# Patient Record
Sex: FEMALE | URBAN - METROPOLITAN AREA
[De-identification: names, ages, dates, MRNs, and addresses within clinical notes are randomized per-mention and may not be internally consistent; named-entity substitution may affect disease eponyms.]

---

## 2017-01-04 ENCOUNTER — APPOINTMENT (RX ONLY)
Dept: URBAN - METROPOLITAN AREA CLINIC 50 | Facility: CLINIC | Age: 47
Setting detail: DERMATOLOGY
End: 2017-01-04

## 2017-01-04 DIAGNOSIS — Z90.1 ACQUIRED ABSENCE OF BREAST AND NIPPLE: ICD-10-CM

## 2017-01-04 PROBLEM — Z90.13 ACQUIRED ABSENCE OF BILATERAL BREASTS AND NIPPLES: Status: ACTIVE | Noted: 2017-01-04

## 2017-01-04 PROCEDURE — ? TISSUE EXPANDER FILL

## 2017-01-04 PROCEDURE — 99024 POSTOP FOLLOW-UP VISIT: CPT

## 2017-01-04 ASSESSMENT — LOCATION DETAILED DESCRIPTION DERM
LOCATION DETAILED: RIGHT NIPPLE
LOCATION DETAILED: LEFT MEDIAL BREAST 8-9:00 REGION

## 2017-01-04 ASSESSMENT — LOCATION ZONE DERM: LOCATION ZONE: TRUNK

## 2017-01-04 ASSESSMENT — LOCATION SIMPLE DESCRIPTION DERM
LOCATION SIMPLE: LEFT BREAST
LOCATION SIMPLE: RIGHT BREAST

## 2017-01-04 NOTE — HPI: BREAST TISSUE EXPANSION ENCOUNTER (BRIEF)
Which Breast(S) Are We Planning To Expand Today?: both breasts
Is This A New Presentation, Or A Follow-Up?: Breast Tissue Expander Fill
When Was The Expander Placement?: 12/13/16

## 2017-01-04 NOTE — PROCEDURE: TISSUE EXPANDER FILL
Wound care was instituted after removal of dressings. Surgical site inspection was performed, incisions cleaned and dressings were changed.
Postcare: The injection site was hemostatic. A small bandaid was applied to the injection site. There were no apparent complications.
Volume Added (Cc): 106 Jackie Romero
Starting Volume (Cc): 0818 [facility]
Tissue Expander Text: The tissue expander port site was identified. The skin overlying the port site was sterilely cleansed. The needle was introduced into the expander, entry was confirmed by drawback on the plunger.
Detail Level: Detailed
Starting Volume (Cc): 250
Volume Added (Cc): 100

## 2017-01-05 ENCOUNTER — RX ONLY (OUTPATIENT)
Age: 47
Setting detail: RX ONLY
End: 2017-01-05

## 2017-01-05 RX ORDER — CEPHALEXIN 500 MG/1
CAPSULE ORAL TID
Qty: 21 | Refills: 0 | Status: ERX

## 2017-01-09 ENCOUNTER — APPOINTMENT (RX ONLY)
Dept: URBAN - METROPOLITAN AREA CLINIC 50 | Facility: CLINIC | Age: 47
Setting detail: DERMATOLOGY
End: 2017-01-09

## 2017-01-09 ENCOUNTER — RX ONLY (OUTPATIENT)
Age: 47
Setting detail: RX ONLY
End: 2017-01-09

## 2017-01-09 DIAGNOSIS — Z48.89 ENCOUNTER FOR OTHER SPECIFIED SURGICAL AFTERCARE: ICD-10-CM

## 2017-01-09 PROCEDURE — ? POST-OP CHECK

## 2017-01-09 PROCEDURE — 99024 POSTOP FOLLOW-UP VISIT: CPT

## 2017-01-09 RX ORDER — SULFAMETHOXAZOLE AND TRIMETHOPRIM 800; 160 MG/1; MG/1
TABLET ORAL
Qty: 14 | Refills: 1 | Status: ERX | COMMUNITY
Start: 2017-01-09

## 2017-01-09 RX ORDER — CEPHALEXIN 500 MG/1
CAPSULE ORAL
Qty: 21 | Refills: 1 | Status: ERX

## 2017-01-09 ASSESSMENT — LOCATION ZONE DERM: LOCATION ZONE: TRUNK

## 2017-01-09 ASSESSMENT — LOCATION SIMPLE DESCRIPTION DERM: LOCATION SIMPLE: LEFT BREAST

## 2017-01-09 ASSESSMENT — LOCATION DETAILED DESCRIPTION DERM: LOCATION DETAILED: LEFT AREOLA

## 2017-01-09 NOTE — PROCEDURE: POST-OP CHECK
Add Postop Global No-Charge Code (63388)?: yes
Detail Level: Detailed
Wound Evaluated By: Dr. Keaton Boston

## 2017-01-11 ENCOUNTER — APPOINTMENT (RX ONLY)
Dept: URBAN - METROPOLITAN AREA CLINIC 50 | Facility: CLINIC | Age: 47
Setting detail: DERMATOLOGY
End: 2017-01-11

## 2017-01-11 DIAGNOSIS — Z48.89 ENCOUNTER FOR OTHER SPECIFIED SURGICAL AFTERCARE: ICD-10-CM

## 2017-01-11 PROCEDURE — 99024 POSTOP FOLLOW-UP VISIT: CPT

## 2017-01-11 PROCEDURE — ? POST-OP CHECK

## 2017-01-11 ASSESSMENT — LOCATION SIMPLE DESCRIPTION DERM: LOCATION SIMPLE: LEFT BREAST

## 2017-01-11 ASSESSMENT — LOCATION DETAILED DESCRIPTION DERM: LOCATION DETAILED: LEFT MEDIAL BREAST 9-10:00 REGION

## 2017-01-11 ASSESSMENT — LOCATION ZONE DERM: LOCATION ZONE: TRUNK

## 2017-01-11 NOTE — HPI: POST-OP CHECK
History: Patient presents for post-op check status post bilateral immediate breast reconstruction with expander insertion done on 12/13/16.

## 2017-01-11 NOTE — PROCEDURE: POST-OP CHECK
Detail Level: Detailed
Add Postop Global No-Charge Code (00020)?: yes
Body Location Override (Optional): Bilateral chest
Wound Evaluated By: Dr. Keaton Boston

## 2017-01-18 ENCOUNTER — APPOINTMENT (RX ONLY)
Dept: URBAN - METROPOLITAN AREA CLINIC 50 | Facility: CLINIC | Age: 47
Setting detail: DERMATOLOGY
End: 2017-01-18

## 2017-01-18 DIAGNOSIS — Z90.1 ACQUIRED ABSENCE OF BREAST AND NIPPLE: ICD-10-CM

## 2017-01-18 PROBLEM — Z90.13 ACQUIRED ABSENCE OF BILATERAL BREASTS AND NIPPLES: Status: ACTIVE | Noted: 2017-01-18

## 2017-01-18 PROCEDURE — ? TISSUE EXPANDER FILL

## 2017-01-18 PROCEDURE — 99024 POSTOP FOLLOW-UP VISIT: CPT

## 2017-01-18 ASSESSMENT — LOCATION DETAILED DESCRIPTION DERM
LOCATION DETAILED: RIGHT MEDIAL BREAST 2-3:00 REGION
LOCATION DETAILED: RIGHT AREOLA
LOCATION DETAILED: LEFT AREOLA

## 2017-01-18 ASSESSMENT — LOCATION ZONE DERM: LOCATION ZONE: TRUNK

## 2017-01-18 ASSESSMENT — LOCATION SIMPLE DESCRIPTION DERM
LOCATION SIMPLE: LEFT BREAST
LOCATION SIMPLE: RIGHT BREAST

## 2017-01-18 NOTE — PROCEDURE: TISSUE EXPANDER FILL
Tissue Expander Text: The tissue expander port site was identified. The skin overlying the port site was sterilely cleansed. The needle was introduced into the expander, entry was confirmed by drawback on the plunger.
Postcare: The injection site was hemostatic. A small bandaid was applied to the injection site. There were no apparent complications.
Volume Added (Cc): 106 Jackie Romero
Wound care was instituted after removal of dressings. Surgical site inspection was performed, incisions cleaned and dressings were changed.
Volume Added (Cc): 120
Detail Level: Detailed
Volume Added (Cc): 120cc B/L. Final fill volume today: R - 490.    L - 470
Starting Volume (Cc): R - 370.  L - 350

## 2017-01-18 NOTE — HPI: POST-OP CHECK
History: Patient presents today for further tissue expansion bilateral breast status post bilateral mastectomy with immediate reconstruction insertion of tissue expanders.

## 2017-01-23 ENCOUNTER — APPOINTMENT (RX ONLY)
Dept: URBAN - METROPOLITAN AREA CLINIC 50 | Facility: CLINIC | Age: 47
Setting detail: DERMATOLOGY
End: 2017-01-23

## 2017-01-23 DIAGNOSIS — Z90.1 ACQUIRED ABSENCE OF BREAST AND NIPPLE: ICD-10-CM

## 2017-01-23 PROBLEM — Z90.13 ACQUIRED ABSENCE OF BILATERAL BREASTS AND NIPPLES: Status: ACTIVE | Noted: 2017-01-23

## 2017-01-23 PROCEDURE — ? TISSUE EXPANDER FILL

## 2017-01-23 PROCEDURE — 99024 POSTOP FOLLOW-UP VISIT: CPT

## 2017-01-23 ASSESSMENT — LOCATION DETAILED DESCRIPTION DERM
LOCATION DETAILED: LEFT PERIAREOLAR BREAST 6-7:00 REGION
LOCATION DETAILED: RIGHT PERIAREOLAR BREAST 5-6:00 REGION

## 2017-01-23 ASSESSMENT — LOCATION SIMPLE DESCRIPTION DERM
LOCATION SIMPLE: RIGHT BREAST
LOCATION SIMPLE: LEFT BREAST

## 2017-01-23 ASSESSMENT — LOCATION ZONE DERM: LOCATION ZONE: TRUNK

## 2017-01-23 NOTE — PROCEDURE: TISSUE EXPANDER FILL
Wound care was instituted after removal of dressings. Surgical site inspection was performed, incisions cleaned and dressings were changed.
Volume Added (Cc): 100
Detail Level: Detailed
Tissue Expander Text: The tissue expander port site was identified. The skin overlying the port site was sterilely cleansed. The needle was introduced into the expander, entry was confirmed by drawback on the plunger.
Starting Volume (Cc): [address]
Starting Volume (Cc): 409 [facility]
Postcare: The injection site was hemostatic. A small bandaid was applied to the injection site. There were no apparent complications.

## 2017-01-30 ENCOUNTER — APPOINTMENT (RX ONLY)
Dept: URBAN - METROPOLITAN AREA CLINIC 50 | Facility: CLINIC | Age: 47
Setting detail: DERMATOLOGY
End: 2017-01-30

## 2017-01-30 DIAGNOSIS — Z90.1 ACQUIRED ABSENCE OF BREAST AND NIPPLE: ICD-10-CM

## 2017-01-30 PROBLEM — Z90.13 ACQUIRED ABSENCE OF BILATERAL BREASTS AND NIPPLES: Status: ACTIVE | Noted: 2017-01-30

## 2017-01-30 PROCEDURE — ? TISSUE EXPANDER FILL

## 2017-01-30 ASSESSMENT — LOCATION DETAILED DESCRIPTION DERM
LOCATION DETAILED: RIGHT NIPPLE
LOCATION DETAILED: LEFT AREOLA

## 2017-01-30 ASSESSMENT — LOCATION SIMPLE DESCRIPTION DERM
LOCATION SIMPLE: RIGHT BREAST
LOCATION SIMPLE: LEFT BREAST

## 2017-01-30 ASSESSMENT — LOCATION ZONE DERM: LOCATION ZONE: TRUNK

## 2017-01-30 NOTE — HPI: POST-OP CHECK
History: Patient presents today for post-op check and further tissue expansion to the bilateral breasts due bilateral mastectomy with immediate insertion of expanders done on 12/13/16.

## 2017-02-13 ENCOUNTER — APPOINTMENT (RX ONLY)
Dept: URBAN - METROPOLITAN AREA CLINIC 50 | Facility: CLINIC | Age: 47
Setting detail: DERMATOLOGY
End: 2017-02-13

## 2017-02-13 DIAGNOSIS — Z48.89 ENCOUNTER FOR OTHER SPECIFIED SURGICAL AFTERCARE: ICD-10-CM

## 2017-02-13 PROCEDURE — 99024 POSTOP FOLLOW-UP VISIT: CPT

## 2017-02-13 PROCEDURE — ? POST-OP CHECK

## 2017-02-13 ASSESSMENT — LOCATION SIMPLE DESCRIPTION DERM
LOCATION SIMPLE: RIGHT BREAST
LOCATION SIMPLE: LEFT BREAST

## 2017-02-13 ASSESSMENT — LOCATION ZONE DERM: LOCATION ZONE: TRUNK

## 2017-02-13 ASSESSMENT — LOCATION DETAILED DESCRIPTION DERM
LOCATION DETAILED: RIGHT MEDIAL BREAST 5-6:00 REGION
LOCATION DETAILED: LEFT MEDIAL BREAST 8-9:00 REGION

## 2017-02-13 NOTE — PROCEDURE: POST-OP CHECK
Add Postop Global No-Charge Code (26727)?: yes
Detail Level: Detailed
Wound Evaluated By: Dr. Yudith Fernandes
Wound Evaluated By: Dr. Becky Messina

## 2017-03-20 ENCOUNTER — APPOINTMENT (RX ONLY)
Dept: URBAN - METROPOLITAN AREA CLINIC 50 | Facility: CLINIC | Age: 47
Setting detail: DERMATOLOGY
End: 2017-03-20

## 2017-03-20 DIAGNOSIS — Z48.89 ENCOUNTER FOR OTHER SPECIFIED SURGICAL AFTERCARE: ICD-10-CM

## 2017-03-20 PROCEDURE — 99024 POSTOP FOLLOW-UP VISIT: CPT

## 2017-03-20 PROCEDURE — ? POST-OP CHECK

## 2017-03-20 ASSESSMENT — LOCATION DETAILED DESCRIPTION DERM: LOCATION DETAILED: LEFT MEDIAL BREAST 10-11:00 REGION

## 2017-03-20 ASSESSMENT — LOCATION SIMPLE DESCRIPTION DERM: LOCATION SIMPLE: LEFT BREAST

## 2017-03-20 ASSESSMENT — LOCATION ZONE DERM: LOCATION ZONE: TRUNK

## 2017-03-20 NOTE — HPI: POST-OP CHECK
History: Patient presents today for re-check status post bilateral breast reconstruction done on 12/16/16. Patient states she has no concerns today just a general re-check.

## 2017-03-20 NOTE — PROCEDURE: POST-OP CHECK
Wound Evaluated By: Dr. Chris Mar
Add Postop Global No-Charge Code (09738)?: yes
Detail Level: Detailed
Body Location Override (Optional): Bilateral breasts

## 2017-04-17 ENCOUNTER — RX ONLY (OUTPATIENT)
Age: 47
Setting detail: RX ONLY
End: 2017-04-17

## 2017-04-17 ENCOUNTER — APPOINTMENT (RX ONLY)
Dept: URBAN - METROPOLITAN AREA CLINIC 50 | Facility: CLINIC | Age: 47
Setting detail: DERMATOLOGY
End: 2017-04-17

## 2017-04-17 DIAGNOSIS — Z48.89 ENCOUNTER FOR OTHER SPECIFIED SURGICAL AFTERCARE: ICD-10-CM

## 2017-04-17 PROCEDURE — 99024 POSTOP FOLLOW-UP VISIT: CPT

## 2017-04-17 PROCEDURE — ? POST-OP CHECK

## 2017-04-17 RX ORDER — CEPHALEXIN 500 MG/1
CAPSULE ORAL
Qty: 21 | Refills: 0 | Status: ERX

## 2017-04-17 ASSESSMENT — LOCATION ZONE DERM: LOCATION ZONE: TRUNK

## 2017-04-17 ASSESSMENT — LOCATION SIMPLE DESCRIPTION DERM: LOCATION SIMPLE: LEFT BREAST

## 2017-04-17 ASSESSMENT — LOCATION DETAILED DESCRIPTION DERM: LOCATION DETAILED: LEFT MEDIAL BREAST 7-8:00 REGION

## 2017-04-17 NOTE — PROCEDURE: POST-OP CHECK
Detail Level: Detailed
Wound Evaluated By: Dr. David Tamayo
Body Location Override (Optional): Bilateral breast
Add Postop Global No-Charge Code (95689)?: yes

## 2017-05-17 ENCOUNTER — APPOINTMENT (RX ONLY)
Dept: URBAN - METROPOLITAN AREA CLINIC 50 | Facility: CLINIC | Age: 47
Setting detail: DERMATOLOGY
End: 2017-05-17

## 2017-05-17 DIAGNOSIS — Z48.89 ENCOUNTER FOR OTHER SPECIFIED SURGICAL AFTERCARE: ICD-10-CM

## 2017-05-17 PROCEDURE — 99024 POSTOP FOLLOW-UP VISIT: CPT

## 2017-05-17 PROCEDURE — ? POST-OP CHECK

## 2017-05-17 ASSESSMENT — LOCATION ZONE DERM: LOCATION ZONE: TRUNK

## 2017-05-17 ASSESSMENT — LOCATION DETAILED DESCRIPTION DERM: LOCATION DETAILED: RIGHT MEDIAL BREAST 5-6:00 REGION

## 2017-05-17 ASSESSMENT — LOCATION SIMPLE DESCRIPTION DERM: LOCATION SIMPLE: RIGHT BREAST

## 2017-05-17 NOTE — PROCEDURE: POST-OP CHECK
Wound Evaluated By: Dr. Diana Rivas
Detail Level: Detailed
Add Postop Global No-Charge Code (51431)?: yes
Body Location Override (Optional): Bilateral breast

## 2017-05-17 NOTE — HPI: POST-OP CHECK, BREAST RECONSTRUCTION (EXTENDED)
Where Is Your Surgical Site To Be Checked?: both breasts
How Severe Is Your Pain?: moderate
How Is Your Wound Healing?: healing well
Date Of Procedure: 5/16/201

## 2017-05-24 ENCOUNTER — APPOINTMENT (RX ONLY)
Dept: URBAN - METROPOLITAN AREA CLINIC 50 | Facility: CLINIC | Age: 47
Setting detail: DERMATOLOGY
End: 2017-05-24

## 2017-05-24 DIAGNOSIS — Z48.89 ENCOUNTER FOR OTHER SPECIFIED SURGICAL AFTERCARE: ICD-10-CM

## 2017-05-24 PROCEDURE — ? POST-OP CHECK

## 2017-05-24 PROCEDURE — 99024 POSTOP FOLLOW-UP VISIT: CPT

## 2017-05-24 ASSESSMENT — LOCATION DETAILED DESCRIPTION DERM
LOCATION DETAILED: RIGHT MEDIAL BREAST 4-5:00 REGION
LOCATION DETAILED: LEFT MEDIAL BREAST 7-8:00 REGION

## 2017-05-24 ASSESSMENT — LOCATION SIMPLE DESCRIPTION DERM
LOCATION SIMPLE: LEFT BREAST
LOCATION SIMPLE: RIGHT BREAST

## 2017-05-24 ASSESSMENT — LOCATION ZONE DERM: LOCATION ZONE: TRUNK

## 2017-05-24 NOTE — PROCEDURE: POST-OP CHECK
Add Postop Global No-Charge Code (75577)?: yes
Detail Level: Detailed
Wound Evaluated By: Dr. Maren Borrero
Wound Evaluated By: Dr. Marko Strong

## 2017-05-30 ENCOUNTER — APPOINTMENT (RX ONLY)
Dept: URBAN - METROPOLITAN AREA CLINIC 50 | Facility: CLINIC | Age: 47
Setting detail: DERMATOLOGY
End: 2017-05-30

## 2017-05-30 DIAGNOSIS — Z48.89 ENCOUNTER FOR OTHER SPECIFIED SURGICAL AFTERCARE: ICD-10-CM

## 2017-05-30 PROCEDURE — 99024 POSTOP FOLLOW-UP VISIT: CPT

## 2017-05-30 PROCEDURE — ? POST-OP CHECK

## 2017-05-30 ASSESSMENT — LOCATION ZONE DERM: LOCATION ZONE: TRUNK

## 2017-05-30 ASSESSMENT — LOCATION DETAILED DESCRIPTION DERM: LOCATION DETAILED: RIGHT PERIAREOLAR BREAST 4-5:00 REGION

## 2017-05-30 ASSESSMENT — LOCATION SIMPLE DESCRIPTION DERM: LOCATION SIMPLE: RIGHT BREAST

## 2017-05-30 NOTE — PROCEDURE: POST-OP CHECK
Body Location Override (Optional): Bilateral breasts
Wound Evaluated By: Dr. Sarabjit Peralta
Add Postop Global No-Charge Code (81066)?: yes
Detail Level: Detailed

## 2017-06-05 ENCOUNTER — RX ONLY (OUTPATIENT)
Age: 47
Setting detail: RX ONLY
End: 2017-06-05

## 2017-06-05 ENCOUNTER — APPOINTMENT (RX ONLY)
Dept: URBAN - METROPOLITAN AREA CLINIC 50 | Facility: CLINIC | Age: 47
Setting detail: DERMATOLOGY
End: 2017-06-05

## 2017-06-05 DIAGNOSIS — Z48.02 ENCOUNTER FOR REMOVAL OF SUTURES: ICD-10-CM

## 2017-06-05 PROCEDURE — ? SUTURE REMOVAL

## 2017-06-05 PROCEDURE — 99024 POSTOP FOLLOW-UP VISIT: CPT

## 2017-06-05 RX ORDER — CEPHALEXIN 500 MG/1
CAPSULE ORAL
Qty: 21 | Refills: 0 | Status: ERX

## 2017-06-05 ASSESSMENT — LOCATION SIMPLE DESCRIPTION DERM
LOCATION SIMPLE: RIGHT BREAST
LOCATION SIMPLE: LEFT BREAST

## 2017-06-05 ASSESSMENT — LOCATION DETAILED DESCRIPTION DERM
LOCATION DETAILED: LEFT AREOLA
LOCATION DETAILED: RIGHT PERIAREOLAR BREAST 5-6:00 REGION

## 2017-06-05 ASSESSMENT — LOCATION ZONE DERM: LOCATION ZONE: TRUNK

## 2017-06-12 ENCOUNTER — RX ONLY (OUTPATIENT)
Age: 47
Setting detail: RX ONLY
End: 2017-06-12

## 2017-06-12 ENCOUNTER — APPOINTMENT (RX ONLY)
Dept: URBAN - METROPOLITAN AREA CLINIC 50 | Facility: CLINIC | Age: 47
Setting detail: DERMATOLOGY
End: 2017-06-12

## 2017-06-12 DIAGNOSIS — Z48.89 ENCOUNTER FOR OTHER SPECIFIED SURGICAL AFTERCARE: ICD-10-CM

## 2017-06-12 PROCEDURE — 99024 POSTOP FOLLOW-UP VISIT: CPT

## 2017-06-12 PROCEDURE — ? POST-OP CHECK

## 2017-06-12 RX ORDER — DOXYCYCLINE HYCLATE 100 MG/1
CAPSULE, GELATIN COATED ORAL
Qty: 14 | Refills: 1 | Status: ERX | COMMUNITY
Start: 2017-06-12

## 2017-06-12 ASSESSMENT — LOCATION SIMPLE DESCRIPTION DERM: LOCATION SIMPLE: RIGHT BREAST

## 2017-06-12 ASSESSMENT — LOCATION ZONE DERM: LOCATION ZONE: TRUNK

## 2017-06-12 ASSESSMENT — LOCATION DETAILED DESCRIPTION DERM: LOCATION DETAILED: RIGHT MEDIAL BREAST 3-4:00 REGION

## 2017-06-12 NOTE — PROCEDURE: POST-OP CHECK
Detail Level: Detailed
Add Postop Global No-Charge Code ([id number])?: yes
Wound Evaluated By: Dr. Antonia Paulino

## 2017-06-19 ENCOUNTER — APPOINTMENT (RX ONLY)
Dept: URBAN - METROPOLITAN AREA CLINIC 50 | Facility: CLINIC | Age: 47
Setting detail: DERMATOLOGY
End: 2017-06-19

## 2017-06-19 DIAGNOSIS — Z48.89 ENCOUNTER FOR OTHER SPECIFIED SURGICAL AFTERCARE: ICD-10-CM

## 2017-06-19 PROCEDURE — ? POST-OP CHECK

## 2017-06-19 PROCEDURE — 99024 POSTOP FOLLOW-UP VISIT: CPT

## 2017-06-19 ASSESSMENT — LOCATION DETAILED DESCRIPTION DERM: LOCATION DETAILED: RIGHT LATERAL BREAST 6-7:00 REGION

## 2017-06-19 ASSESSMENT — LOCATION ZONE DERM: LOCATION ZONE: TRUNK

## 2017-06-19 ASSESSMENT — LOCATION SIMPLE DESCRIPTION DERM: LOCATION SIMPLE: RIGHT BREAST

## 2017-06-19 NOTE — PROCEDURE: POST-OP CHECK
Wound Evaluated By: Dr. Marko Strong
Detail Level: Detailed
Body Location Override (Optional): bilateral breasts
Add Postop Global No-Charge Code (96962)?: yes

## 2017-06-19 NOTE — HPI: POST-OP CHECK
History: Patient presents today for re-check status post exchange for permanent implant exchange bilateral breasts done on 5/16/17.

## 2017-07-10 ENCOUNTER — APPOINTMENT (RX ONLY)
Dept: URBAN - METROPOLITAN AREA CLINIC 50 | Facility: CLINIC | Age: 47
Setting detail: DERMATOLOGY
End: 2017-07-10

## 2017-07-10 DIAGNOSIS — Z48.89 ENCOUNTER FOR OTHER SPECIFIED SURGICAL AFTERCARE: ICD-10-CM

## 2017-07-10 PROCEDURE — 99024 POSTOP FOLLOW-UP VISIT: CPT

## 2017-07-10 PROCEDURE — ? POST-OP CHECK

## 2017-07-10 ASSESSMENT — LOCATION DETAILED DESCRIPTION DERM: LOCATION DETAILED: RIGHT PERIAREOLAR BREAST 4-5:00 REGION

## 2017-07-10 ASSESSMENT — LOCATION SIMPLE DESCRIPTION DERM: LOCATION SIMPLE: RIGHT BREAST

## 2017-07-10 ASSESSMENT — LOCATION ZONE DERM: LOCATION ZONE: TRUNK

## 2017-07-10 NOTE — HPI: POST-OP CHECK
History: Patient presents for routine follow up status post bilateral breast reconstruction. Patient states seems to be healing as aspected no specific concerns today.

## 2017-07-10 NOTE — PROCEDURE: POST-OP CHECK
Detail Level: Detailed
Wound Evaluated By: Dr. Nick Coughlin
Body Location Override (Optional): bilateral breasts
Add Postop Global No-Charge Code (05634)?: yes

## 2017-07-31 ENCOUNTER — APPOINTMENT (RX ONLY)
Dept: URBAN - METROPOLITAN AREA CLINIC 50 | Facility: CLINIC | Age: 47
Setting detail: DERMATOLOGY
End: 2017-07-31

## 2017-07-31 DIAGNOSIS — L98.8 OTHER SPECIFIED DISORDERS OF THE SKIN AND SUBCUTANEOUS TISSUE: ICD-10-CM

## 2017-07-31 DIAGNOSIS — Z48.89 ENCOUNTER FOR OTHER SPECIFIED SURGICAL AFTERCARE: ICD-10-CM

## 2017-07-31 PROBLEM — L90.8 OTHER ATROPHIC DISORDERS OF SKIN: Status: ACTIVE | Noted: 2017-07-31

## 2017-07-31 PROCEDURE — ? POST-OP CHECK

## 2017-07-31 PROCEDURE — ? BOTOX (U OR CC)

## 2017-07-31 PROCEDURE — 99024 POSTOP FOLLOW-UP VISIT: CPT

## 2017-07-31 ASSESSMENT — LOCATION ZONE DERM: LOCATION ZONE: TRUNK

## 2017-07-31 ASSESSMENT — LOCATION DETAILED DESCRIPTION DERM: LOCATION DETAILED: RIGHT MEDIAL BREAST 2-3:00 REGION

## 2017-07-31 ASSESSMENT — LOCATION SIMPLE DESCRIPTION DERM: LOCATION SIMPLE: RIGHT BREAST

## 2017-07-31 NOTE — PROCEDURE: POST-OP CHECK
Body Location Override (Optional): bilateral breasts
Detail Level: Detailed
Wound Evaluated By: Dr. Elizabeth Horner
Add Postop Global No-Charge Code (93385)?: yes

## 2017-07-31 NOTE — PROCEDURE: BOTOX (U OR CC)
Dilution (U/0.1 Cc): 1.5
Nasal Root Units/Cc: 0
Document As Units Or Cc?: units
Consent: Written consent obtained. Risks include but not limited to lid/brow ptosis, bruising, swelling, diplopia, temporary effect, incomplete chemical denervation.
Detail Level: Detailed
Glabellar Complex Units: 4
Expiration Date (Month Year): 10/17
Periorbital Skin Units/Cc: 6
Lot #:  [id number]
Topical Anesthesia?: 20% benzocaine, 8% lidocaine, 4% tetracaine
Forehead Units/Cc: 10
Post-Care Instructions: Patient instructed to not lie down for 4 hours and limit physical activity for 24 hours. Patient instructed not to travel by airplane for 48 hours.

## 2017-07-31 NOTE — HPI: POST-OP CHECK
History: Patient presents today for re-check status post bilateral breast reconstruction done on 5/16/17.

## 2017-08-28 ENCOUNTER — APPOINTMENT (RX ONLY)
Dept: URBAN - METROPOLITAN AREA CLINIC 50 | Facility: CLINIC | Age: 47
Setting detail: DERMATOLOGY
End: 2017-08-28

## 2017-08-28 DIAGNOSIS — Z48.89 ENCOUNTER FOR OTHER SPECIFIED SURGICAL AFTERCARE: ICD-10-CM

## 2017-08-28 PROCEDURE — 99024 POSTOP FOLLOW-UP VISIT: CPT

## 2017-08-28 PROCEDURE — ? POST-OP CHECK

## 2017-08-28 ASSESSMENT — LOCATION DETAILED DESCRIPTION DERM
LOCATION DETAILED: RIGHT PERIAREOLAR BREAST 3-4:00 REGION
LOCATION DETAILED: LEFT AREOLA

## 2017-08-28 ASSESSMENT — LOCATION SIMPLE DESCRIPTION DERM
LOCATION SIMPLE: LEFT BREAST
LOCATION SIMPLE: RIGHT BREAST

## 2017-08-28 ASSESSMENT — LOCATION ZONE DERM: LOCATION ZONE: TRUNK

## 2017-08-28 NOTE — PROCEDURE: POST-OP CHECK
Wound Evaluated By: Dr. Aida Vidal
Detail Level: Detailed
Wound Evaluated By: Dr. Keaton Boston
Add Postop Global No-Charge Code (91675)?: yes

## 2017-11-13 ENCOUNTER — RX ONLY (OUTPATIENT)
Age: 47
Setting detail: RX ONLY
End: 2017-11-13

## 2017-11-13 ENCOUNTER — APPOINTMENT (RX ONLY)
Dept: URBAN - METROPOLITAN AREA CLINIC 50 | Facility: CLINIC | Age: 47
Setting detail: DERMATOLOGY
End: 2017-11-13

## 2017-11-13 DIAGNOSIS — N65.1 DISPROPORTION OF RECONSTRUCTED BREAST: ICD-10-CM

## 2017-11-13 PROCEDURE — 20926: CPT

## 2017-11-13 PROCEDURE — ? LIPOSUCTION AND FAT GRAFTING

## 2017-11-13 PROCEDURE — ? OTHER

## 2017-11-13 RX ORDER — CEPHALEXIN 500 MG/1
CAPSULE ORAL TID
Qty: 12 | Refills: 0 | Status: ERX

## 2017-11-13 ASSESSMENT — LOCATION SIMPLE DESCRIPTION DERM: LOCATION SIMPLE: LEFT BREAST

## 2017-11-13 ASSESSMENT — LOCATION DETAILED DESCRIPTION DERM: LOCATION DETAILED: LEFT NIPPLE

## 2017-11-13 ASSESSMENT — LOCATION ZONE DERM: LOCATION ZONE: TRUNK

## 2017-11-13 NOTE — PROCEDURE: OTHER
Sticky Other (Free Text): Fat grafting to the left nipple for symmetry taking a total of 20 cc bilateral flanks 10 cc from each side grafting it to the left nipple area today.
Detail Level: Zone

## 2017-11-13 NOTE — PROCEDURE: LIPOSUCTION AND FAT GRAFTING
Total Aspirate Volume In Cc: 1 [facility]
Detail Level: Detailed
Surgeon: Ed Whitehead
Total Supernatant Fat Aspirate Volume In Cc: 24
Estimated Blood Loss (Cc): minimal
Positioning: The patient was placed supine for liposuction.
[facility]
Left Breast: 7cc
Consent: The risks, benefits, expectations and alternatives of liposuction were discussed with the patient preoperatively and include but are not limited to: infection, bruising, lumpiness, pain, anesthesia reaction, dysethesia, scarring in treatment area or puncture point, vasovagal reactions, tachycardia, nausea, necrosis, ulceration, color change and asymmetry.
Total Tumescent Anesthesia Volume In Cc: [address] [place]
Monitoring: Full continuous cardiac monitoring and automated blood-pressure measurements were performed per protocol.
Pre-Tunneling: Pretunneling was performed bluntly prior to infiltration of wetting solution.
Infiltration: Wetting solution was infiltrated into the treatment area using infiltration cannulae, distributing the solution evenly until satisfactory tissue turgor was achieved.

## 2017-11-13 NOTE — HPI: OTHER
Condition:: Asymmetry left nipple status post breast cancer reconstruction surgery. Presents today for fat grafting to the left nipple taking fat from right and left flank areas about 10 cc from each side.
Please Describe Your Condition:: Left nipple asymmetry

## 2017-11-20 ENCOUNTER — APPOINTMENT (RX ONLY)
Dept: URBAN - METROPOLITAN AREA CLINIC 50 | Facility: CLINIC | Age: 47
Setting detail: DERMATOLOGY
End: 2017-11-20

## 2017-11-20 DIAGNOSIS — Z48.89 ENCOUNTER FOR OTHER SPECIFIED SURGICAL AFTERCARE: ICD-10-CM

## 2017-11-20 PROCEDURE — 99024 POSTOP FOLLOW-UP VISIT: CPT

## 2017-11-20 PROCEDURE — ? POST-OP CHECK

## 2017-11-20 ASSESSMENT — LOCATION DETAILED DESCRIPTION DERM: LOCATION DETAILED: LEFT MEDIAL BREAST 9-10:00 REGION

## 2017-11-20 ASSESSMENT — LOCATION SIMPLE DESCRIPTION DERM: LOCATION SIMPLE: LEFT BREAST

## 2017-11-20 ASSESSMENT — LOCATION ZONE DERM: LOCATION ZONE: TRUNK

## 2018-01-22 ENCOUNTER — APPOINTMENT (RX ONLY)
Dept: URBAN - METROPOLITAN AREA CLINIC 19 | Facility: CLINIC | Age: 48
Setting detail: DERMATOLOGY
End: 2018-01-22

## 2018-01-22 DIAGNOSIS — L98.8 OTHER SPECIFIED DISORDERS OF THE SKIN AND SUBCUTANEOUS TISSUE: ICD-10-CM

## 2018-01-22 PROCEDURE — ? BOTOX (U OR CC)

## 2018-01-22 NOTE — PROCEDURE: BOTOX (U OR CC)
Detail Level: Detailed
Additional Area 4 Units: 0
Dilution (U/0.1 Cc): 2.5
Glabellar Complex Units: 8
Consent: Written consent obtained. Risks include but not limited to lid/brow ptosis, bruising, swelling, diplopia, temporary effect, incomplete chemical denervation.
Post-Care Instructions: Patient instructed to not lie down for 4 hours and limit physical activity for 24 hours. Patient instructed not to travel by airplane for 48 hours.
Lot #: [id number]
Document As Units Or Cc?: units
Depressor Anguli Oris Units: 4

## 2018-04-04 ENCOUNTER — APPOINTMENT (RX ONLY)
Dept: URBAN - METROPOLITAN AREA CLINIC 19 | Facility: CLINIC | Age: 48
Setting detail: DERMATOLOGY
End: 2018-04-04

## 2018-04-04 DIAGNOSIS — Z48.89 ENCOUNTER FOR OTHER SPECIFIED SURGICAL AFTERCARE: ICD-10-CM

## 2018-04-04 DIAGNOSIS — N65.1 DISPROPORTION OF RECONSTRUCTED BREAST: ICD-10-CM

## 2018-04-04 PROCEDURE — ? POST-OP CHECK

## 2018-04-04 PROCEDURE — 99024 POSTOP FOLLOW-UP VISIT: CPT

## 2018-04-04 PROCEDURE — ? COUNSELING - ASYMMETRY OF NATIVE BREAST

## 2018-04-04 ASSESSMENT — LOCATION SIMPLE DESCRIPTION DERM: LOCATION SIMPLE: RIGHT BREAST

## 2018-04-04 ASSESSMENT — LOCATION DETAILED DESCRIPTION DERM
LOCATION DETAILED: RIGHT MEDIAL BREAST 5-6:00 REGION
LOCATION DETAILED: RIGHT MEDIAL BREAST 4-5:00 REGION

## 2018-04-04 ASSESSMENT — LOCATION ZONE DERM: LOCATION ZONE: TRUNK

## 2018-04-04 NOTE — PROCEDURE: COUNSELING - ASYMMETRY OF NATIVE BREAST
Detail Level: Detailed
Other Plan: Patient will require revision of Right Breast reconstruction. Patient has developed asymmetrysince her breast reconstruction with enlargement of the byron implant capsular pocket on the right side. Now her implant sits lower and slightly more lateral compared to the left side. With these changes the patient has also lost upper pole fullness on the right. She prefers the appearance of the left breast reconstruction with the implant sitting higher and goldberg. Discussed revising this as outpatient surgery with the use of alloderm or stratice to reinforce the pocket and revise the shape of the implant pocket. Risks and benefits explained to the patient who wishes to proceed.

## 2018-04-04 NOTE — HPI: POST-OP CHECK
History: Patient presents today for post-op check status post breast reconstruction revision surgery done on 5/16/17. Patient is concerned with asymmetry on the right breast size here today to discuss options.

## 2018-04-04 NOTE — PROCEDURE: POST-OP CHECK
Wound Evaluated By: Dr. Sarabjit Peralta
Add Postop Global No-Charge Code ([id number])?: yes
Detail Level: Detailed
Body Location Override (Optional): bilateral breasts

## 2018-05-30 ENCOUNTER — RX ONLY (OUTPATIENT)
Age: 48
Setting detail: RX ONLY
End: 2018-05-30

## 2018-05-30 ENCOUNTER — APPOINTMENT (RX ONLY)
Dept: URBAN - METROPOLITAN AREA CLINIC 19 | Facility: CLINIC | Age: 48
Setting detail: DERMATOLOGY
End: 2018-05-30

## 2018-05-30 DIAGNOSIS — Z90.1 ACQUIRED ABSENCE OF BREAST AND NIPPLE: ICD-10-CM

## 2018-05-30 PROBLEM — Z90.11 ACQUIRED ABSENCE OF RIGHT BREAST AND NIPPLE: Status: ACTIVE | Noted: 2018-05-30

## 2018-05-30 PROCEDURE — ? PRE-OP WORKLIST

## 2018-05-30 PROCEDURE — 99024 POSTOP FOLLOW-UP VISIT: CPT

## 2018-05-30 RX ORDER — CEPHALEXIN 500 MG/1
CAPSULE ORAL
Qty: 21 | Refills: 1 | Status: ERX

## 2018-05-30 ASSESSMENT — LOCATION SIMPLE DESCRIPTION DERM: LOCATION SIMPLE: RIGHT BREAST

## 2018-05-30 ASSESSMENT — LOCATION ZONE DERM: LOCATION ZONE: TRUNK

## 2018-05-30 ASSESSMENT — LOCATION DETAILED DESCRIPTION DERM: LOCATION DETAILED: RIGHT MEDIAL BREAST 4-5:00 REGION

## 2018-06-04 ENCOUNTER — RX ONLY (OUTPATIENT)
Age: 48
Setting detail: RX ONLY
End: 2018-06-04

## 2018-06-04 ENCOUNTER — APPOINTMENT (RX ONLY)
Dept: URBAN - METROPOLITAN AREA CLINIC 19 | Facility: CLINIC | Age: 48
Setting detail: DERMATOLOGY
End: 2018-06-04

## 2018-06-04 DIAGNOSIS — Z48.89 ENCOUNTER FOR OTHER SPECIFIED SURGICAL AFTERCARE: ICD-10-CM

## 2018-06-04 PROCEDURE — 99024 POSTOP FOLLOW-UP VISIT: CPT

## 2018-06-04 PROCEDURE — ? POST-OP CHECK

## 2018-06-04 RX ORDER — SULFAMETHOXAZOLE AND TRIMETHOPRIM 800; 160 MG/1; MG/1
TABLET ORAL BID
Qty: 14 | Refills: 1 | Status: ERX

## 2018-06-04 ASSESSMENT — LOCATION SIMPLE DESCRIPTION DERM: LOCATION SIMPLE: RIGHT BREAST

## 2018-06-04 ASSESSMENT — LOCATION DETAILED DESCRIPTION DERM: LOCATION DETAILED: RIGHT PERIAREOLAR BREAST 5-6:00 REGION

## 2018-06-04 ASSESSMENT — LOCATION ZONE DERM: LOCATION ZONE: TRUNK

## 2018-06-04 NOTE — HPI: POST-OP CHECK
History: Patient presents today for wound check and drain removal from right breast reconstruction revision done on 6/1/18.

## 2018-06-04 NOTE — PROCEDURE: POST-OP CHECK
Detail Level: Detailed
Wound Evaluated By: Dr. Omar Love
Body Location Override (Optional): right breast side
Add Postop Global No-Charge Code (03734)?: yes

## 2018-06-11 ENCOUNTER — APPOINTMENT (RX ONLY)
Dept: URBAN - METROPOLITAN AREA CLINIC 19 | Facility: CLINIC | Age: 48
Setting detail: DERMATOLOGY
End: 2018-06-11

## 2018-06-11 DIAGNOSIS — Z48.89 ENCOUNTER FOR OTHER SPECIFIED SURGICAL AFTERCARE: ICD-10-CM

## 2018-06-11 PROCEDURE — ? SUTURE REMOVAL

## 2018-06-11 PROCEDURE — 99024 POSTOP FOLLOW-UP VISIT: CPT

## 2018-06-11 ASSESSMENT — LOCATION DETAILED DESCRIPTION DERM: LOCATION DETAILED: RIGHT PERIAREOLAR BREAST 12-1:00 REGION

## 2018-06-11 ASSESSMENT — LOCATION ZONE DERM: LOCATION ZONE: TRUNK

## 2018-06-11 ASSESSMENT — LOCATION SIMPLE DESCRIPTION DERM: LOCATION SIMPLE: RIGHT BREAST

## 2018-06-18 ENCOUNTER — APPOINTMENT (RX ONLY)
Dept: URBAN - METROPOLITAN AREA CLINIC 19 | Facility: CLINIC | Age: 48
Setting detail: DERMATOLOGY
End: 2018-06-18

## 2018-06-18 ENCOUNTER — RX ONLY (OUTPATIENT)
Age: 48
Setting detail: RX ONLY
End: 2018-06-18

## 2018-06-18 DIAGNOSIS — Z48.02 ENCOUNTER FOR REMOVAL OF SUTURES: ICD-10-CM

## 2018-06-18 PROCEDURE — 99024 POSTOP FOLLOW-UP VISIT: CPT

## 2018-06-18 PROCEDURE — ? SUTURE REMOVAL

## 2018-06-18 RX ORDER — CEPHALEXIN 500 MG/1
CAPSULE ORAL
Qty: 21 | Refills: 1 | Status: ERX

## 2018-06-18 ASSESSMENT — LOCATION SIMPLE DESCRIPTION DERM: LOCATION SIMPLE: RIGHT BREAST

## 2018-06-18 ASSESSMENT — LOCATION ZONE DERM: LOCATION ZONE: TRUNK

## 2018-06-18 ASSESSMENT — LOCATION DETAILED DESCRIPTION DERM: LOCATION DETAILED: RIGHT PERIAREOLAR BREAST 12-1:00 REGION

## 2018-06-22 ENCOUNTER — RX ONLY (OUTPATIENT)
Age: 48
Setting detail: RX ONLY
End: 2018-06-22

## 2018-06-22 RX ORDER — DOXYCYCLINE HYCLATE 100 MG/1
CAPSULE, GELATIN COATED ORAL
Qty: 14 | Refills: 1 | Status: ERX

## 2018-06-22 RX ORDER — CEPHALEXIN 500 MG/1
CAPSULE ORAL
Qty: 30 | Refills: 1 | Status: ERX

## 2018-07-16 ENCOUNTER — APPOINTMENT (RX ONLY)
Dept: URBAN - METROPOLITAN AREA CLINIC 19 | Facility: CLINIC | Age: 48
Setting detail: DERMATOLOGY
End: 2018-07-16

## 2018-07-16 DIAGNOSIS — L98.8 OTHER SPECIFIED DISORDERS OF THE SKIN AND SUBCUTANEOUS TISSUE: ICD-10-CM

## 2018-07-16 PROCEDURE — ? BOTOX (U OR CC)

## 2018-07-16 NOTE — PROCEDURE: BOTOX (U OR CC)
Additional Area 6 Units: 0
Nasal Root Units/Cc: 4
Consent: Written consent obtained. Risks include but not limited to lid/brow ptosis, bruising, swelling, diplopia, temporary effect, incomplete chemical denervation.
Topical Anesthesia?: 10% benzocaine, 3% lidocaine, 2% tetracaine, 0.01% phenylephrine
Periorbital Skin Units/Cc: 8
Post-Care Instructions: Patient instructed to not lie down for 4 hours and limit physical activity for 24 hours. Patient instructed not to travel by airplane for 48 hours.
Detail Level: Detailed
Document As Units Or Cc?: units
Forehead Units/Cc: 10
Lot #: [id number]
Dilution (U/0.1 Cc): 2.5

## 2018-08-27 ENCOUNTER — APPOINTMENT (RX ONLY)
Dept: URBAN - METROPOLITAN AREA CLINIC 19 | Facility: CLINIC | Age: 48
Setting detail: DERMATOLOGY
End: 2018-08-27

## 2018-08-27 DIAGNOSIS — Z48.89 ENCOUNTER FOR OTHER SPECIFIED SURGICAL AFTERCARE: ICD-10-CM

## 2018-08-27 PROBLEM — Z85.828 PERSONAL HISTORY OF OTHER MALIGNANT NEOPLASM OF SKIN: Status: ACTIVE | Noted: 2018-08-27

## 2018-08-27 PROCEDURE — ? POST-OP CHECK

## 2018-08-27 PROCEDURE — 99024 POSTOP FOLLOW-UP VISIT: CPT

## 2018-08-27 ASSESSMENT — LOCATION ZONE DERM: LOCATION ZONE: TRUNK

## 2018-08-27 ASSESSMENT — LOCATION DETAILED DESCRIPTION DERM: LOCATION DETAILED: RIGHT PERIAREOLAR BREAST 3-4:00 REGION

## 2018-08-27 ASSESSMENT — LOCATION SIMPLE DESCRIPTION DERM: LOCATION SIMPLE: RIGHT BREAST

## 2018-08-27 NOTE — HPI: POST-OP CHECK, BREAST RECONSTRUCTION (EXTENDED)
Where Is Your Surgical Site To Be Checked?: the right breast
What Is Your Overall Satisfaction Level With The Right Breast?: satisfied
Date Of Procedure: 6/1/18

## 2022-11-21 NOTE — PROCEDURE: POST-OP CHECK
Specimens verified per policy.  
Detail Level: Detailed
Add Postop Global No-Charge Code ([id number])?: yes
Wound Evaluated By: Dr. Rusty Santos